# Patient Record
Sex: FEMALE | Race: WHITE | NOT HISPANIC OR LATINO | Employment: UNEMPLOYED | URBAN - NONMETROPOLITAN AREA
[De-identification: names, ages, dates, MRNs, and addresses within clinical notes are randomized per-mention and may not be internally consistent; named-entity substitution may affect disease eponyms.]

---

## 2024-11-07 ENCOUNTER — TELEPHONE (OUTPATIENT)
Dept: OBSTETRICS AND GYNECOLOGY | Facility: CLINIC | Age: 39
End: 2024-11-07
Payer: MEDICAID

## 2024-11-07 NOTE — TELEPHONE ENCOUNTER
"Pt called stating she wants to transfer from [facility] in [facility]. Due to practice \"discharging her.\" Pt is a high risk pt she has high blood pressure, blood clotting disorder etc. Pt is currently 35 weeks and her due date is dec 17th.     I highly recommend really looking into her chart, per jonas.     She is wanting to be seen here but wants to know if we will take her   "

## 2024-11-25 ENCOUNTER — APPOINTMENT (OUTPATIENT)
Dept: OBSTETRICS AND GYNECOLOGY | Facility: CLINIC | Age: 39
End: 2024-11-25
Payer: MEDICAID

## 2024-12-03 ENCOUNTER — APPOINTMENT (OUTPATIENT)
Dept: OBSTETRICS AND GYNECOLOGY | Facility: HOSPITAL | Age: 39
End: 2024-12-03
Payer: MEDICAID